# Patient Record
Sex: FEMALE | Race: WHITE
[De-identification: names, ages, dates, MRNs, and addresses within clinical notes are randomized per-mention and may not be internally consistent; named-entity substitution may affect disease eponyms.]

---

## 2023-07-20 ENCOUNTER — NON-APPOINTMENT (OUTPATIENT)
Age: 48
End: 2023-07-20

## 2023-08-25 ENCOUNTER — APPOINTMENT (OUTPATIENT)
Dept: DERMATOLOGY | Facility: CLINIC | Age: 48
End: 2023-08-25
Payer: COMMERCIAL

## 2023-08-25 DIAGNOSIS — D22.9 MELANOCYTIC NEVI, UNSPECIFIED: ICD-10-CM

## 2023-08-25 DIAGNOSIS — D23.9 OTHER BENIGN NEOPLASM OF SKIN, UNSPECIFIED: ICD-10-CM

## 2023-08-25 PROBLEM — Z00.00 ENCOUNTER FOR PREVENTIVE HEALTH EXAMINATION: Status: ACTIVE | Noted: 2023-08-25

## 2023-08-25 PROCEDURE — 99204 OFFICE O/P NEW MOD 45 MIN: CPT

## 2023-08-25 NOTE — HISTORY OF PRESENT ILLNESS
[FreeTextEntry1] : rash [de-identified] : 47F with hx of Hashimoto thyroiditis presents with rash and TBSE  1. Rash -several year hx of rash that comes and goes -always on the posterior thighs -started after she dry shaved the area, which started an extremely pruritic rash. Occurs during both summer and winter months -has had patch testing several times, most recently with [facility] allergist who she still follows with -Allergist notes reviewed: positive patch test results to Methylisothiazolinone, sodium thiosulfate, and tixocortol. Has been on several course of prednisone which always improves the rash. -pt reports trying multiple topical steroids, most recently per allergist notes are betamethasone and clobetasol, has also tried Eucrisa. Unsure if she has tried protopic or Elidel. -uses ACDS safe list, tries to stick to it -moisturizes with cetaphil -currently on prednisone as she noticed the rash recurring -occ takes antihistamines to help with the itch as well  2. TBSE 3. Skin lesion on right upper thigh -also here for TBSE -only skin lesion of concern is on the right upper thigh, asymptomatic -no personal or family hx of skin cancer -reports moderate sun exposure but rarely burns -wears sunscreen, uses la roche posay -no other new, changing concerning skin lesions

## 2023-08-25 NOTE — ASSESSMENT
[FreeTextEntry1] : #Favor allergic contact dermatitis, posterior thighs -positive patch test results in the past to MI, tixocortol, and STS (unclear significance) -start desoximetasone cream bid to affected areas (class C, should not cross react with tixocortol) -advised pt to stop prednisone -emphasized importance of following safe list, as MI is in many personal care products -can consider repeat patch testing here with dermatology -consider protopic or elidel  #Dermatofibroma, right upper thigh -benign, reassurance  #Multiple benign nevi - Nature of disorder was reviewed with the patient. Reassurance provided.  - Photoprotection discussed, recommend daily broad-spectrum sunscreen, SPF 30 or greater, UPF hat, clothing. - Pt educated on ABCDE of melanoma - Recommend self-skin exam and annual skin exam by MD  - Pt instructed to return for new or changing lesions especially if any moles start to change, itch, or bleed  #Xerosis -gentle skin care, liberal emollients reviewed

## 2023-08-25 NOTE — PHYSICAL EXAM
[FreeTextEntry3] : PE:   General: well-appearing, alert, in no acute distress    Full body skin exam performed examining scalp, head, face, ears, eyes, mouth, neck, chest, back, abdomen, axilla, buttock, b/l arms, b/l forearms, b/l hands, b/l fingernails, b/l thighs, b/l legs, b/l feet, b/l toenails, groin and genitalia. Pertinent findings include:  -firm dark brown papule with button sign on the right upper thigh -scattered brown symmetric macules on the trunk and extremities -scaly pink plaques on the bl posterior thighs, L>R -xerosis, trunk and extremities

## 2023-09-19 ENCOUNTER — APPOINTMENT (OUTPATIENT)
Dept: DERMATOLOGY | Facility: CLINIC | Age: 48
End: 2023-09-19
Payer: COMMERCIAL

## 2023-09-19 DIAGNOSIS — L64.9 ANDROGENIC ALOPECIA, UNSPECIFIED: ICD-10-CM

## 2023-09-19 DIAGNOSIS — L85.3 XEROSIS CUTIS: ICD-10-CM

## 2023-09-19 DIAGNOSIS — L23.9 ALLERGIC CONTACT DERMATITIS, UNSPECIFIED CAUSE: ICD-10-CM

## 2023-09-19 PROCEDURE — 99213 OFFICE O/P EST LOW 20 MIN: CPT

## 2023-09-19 RX ORDER — DESOXIMETASONE 0.5 MG/G
0.05 CREAM TOPICAL TWICE DAILY
Qty: 1 | Refills: 3 | Status: ACTIVE | COMMUNITY
Start: 2023-08-25 | End: 1900-01-01

## 2023-12-10 ENCOUNTER — NON-APPOINTMENT (OUTPATIENT)
Age: 48
End: 2023-12-10

## 2025-05-06 ENCOUNTER — APPOINTMENT (OUTPATIENT)
Dept: DERMATOLOGY | Facility: CLINIC | Age: 50
End: 2025-05-06
Payer: COMMERCIAL

## 2025-05-06 DIAGNOSIS — D23.9 OTHER BENIGN NEOPLASM OF SKIN, UNSPECIFIED: ICD-10-CM

## 2025-05-06 DIAGNOSIS — L64.9 ANDROGENIC ALOPECIA, UNSPECIFIED: ICD-10-CM

## 2025-05-06 DIAGNOSIS — R61 GENERALIZED HYPERHIDROSIS: ICD-10-CM

## 2025-05-06 DIAGNOSIS — D22.9 MELANOCYTIC NEVI, UNSPECIFIED: ICD-10-CM

## 2025-05-06 DIAGNOSIS — L70.0 ACNE VULGARIS: ICD-10-CM

## 2025-05-06 PROCEDURE — 99214 OFFICE O/P EST MOD 30 MIN: CPT

## 2025-05-06 RX ORDER — TRETINOIN 0.25 MG/G
0.03 CREAM TOPICAL
Qty: 1 | Refills: 2 | Status: ACTIVE | COMMUNITY
Start: 2025-05-06 | End: 1900-01-01

## 2025-05-06 RX ORDER — GLYCOPYRRONIUM 2.4 G/100G
2.4 CLOTH TOPICAL
Qty: 1 | Refills: 2 | Status: ACTIVE | COMMUNITY
Start: 2025-05-06 | End: 1900-01-01